# Patient Record
Sex: MALE | Race: WHITE | URBAN - METROPOLITAN AREA
[De-identification: names, ages, dates, MRNs, and addresses within clinical notes are randomized per-mention and may not be internally consistent; named-entity substitution may affect disease eponyms.]

---

## 2024-11-04 ENCOUNTER — HOSPITAL ENCOUNTER (OUTPATIENT)
Dept: GENERAL RADIOLOGY | Age: 59
Discharge: HOME | End: 2024-11-07

## 2024-11-04 DIAGNOSIS — T14.90XA INJURY: ICD-10-CM

## 2024-11-04 PROCEDURE — 73080 X-RAY EXAM OF ELBOW: CPT

## 2024-11-20 ENCOUNTER — TRANSCRIBE ORDERS (OUTPATIENT)
Dept: SCHEDULING | Age: 59
End: 2024-11-20

## 2024-11-20 DIAGNOSIS — M25.522 ELBOW PAIN, CHRONIC, LEFT: Primary | ICD-10-CM

## 2024-11-20 DIAGNOSIS — G89.29 ELBOW PAIN, CHRONIC, LEFT: Primary | ICD-10-CM

## 2025-01-08 ENCOUNTER — OFFICE VISIT (OUTPATIENT)
Age: 60
End: 2025-01-08
Payer: COMMERCIAL

## 2025-01-08 DIAGNOSIS — M54.12 CERVICAL RADICULOPATHY: ICD-10-CM

## 2025-01-08 DIAGNOSIS — G54.9 PLEXOPATHY: ICD-10-CM

## 2025-01-08 DIAGNOSIS — G56.22 CUBITAL TUNNEL SYNDROME ON LEFT: Primary | ICD-10-CM

## 2025-01-08 PROCEDURE — 99204 OFFICE O/P NEW MOD 45 MIN: CPT | Performed by: ORTHOPAEDIC SURGERY

## 2025-01-28 ENCOUNTER — OFFICE VISIT (OUTPATIENT)
Age: 60
End: 2025-01-28

## 2025-01-28 DIAGNOSIS — Z00.00 BLOOD TESTS FOR ROUTINE GENERAL PHYSICAL EXAMINATION: Primary | ICD-10-CM

## 2025-01-28 NOTE — PROGRESS NOTES
Labs drawn as ordered from right ac utilizing best practices. Procedure tolerated well . Specimens labeled and packaged as per protocol, lab  scheduled.

## 2025-01-29 LAB
ALBUMIN SERPL-MCNC: 4.6 G/DL (ref 3.8–4.9)
ALP SERPL-CCNC: 49 IU/L (ref 44–121)
ALT SERPL-CCNC: 27 IU/L (ref 0–44)
AST SERPL-CCNC: 23 IU/L (ref 0–40)
BASOPHILS # BLD AUTO: 0 X10E3/UL (ref 0–0.2)
BASOPHILS NFR BLD AUTO: 0 %
BILIRUB SERPL-MCNC: 0.4 MG/DL (ref 0–1.2)
BUN SERPL-MCNC: 9 MG/DL (ref 6–24)
BUN/CREAT SERPL: 10 (ref 9–20)
CALCIUM SERPL-MCNC: 9.8 MG/DL (ref 8.7–10.2)
CHLORIDE SERPL-SCNC: 99 MMOL/L (ref 96–106)
CO2 SERPL-SCNC: 25 MMOL/L (ref 20–29)
CREAT SERPL-MCNC: 0.86 MG/DL (ref 0.76–1.27)
EGFRCR SERPLBLD CKD-EPI 2021: 100 ML/MIN/1.73
EOSINOPHIL # BLD AUTO: 0.1 X10E3/UL (ref 0–0.4)
EOSINOPHIL NFR BLD AUTO: 1 %
ERYTHROCYTE [DISTWIDTH] IN BLOOD BY AUTOMATED COUNT: 12.3 % (ref 11.6–15.4)
GLOBULIN SER CALC-MCNC: 2.4 G/DL (ref 1.5–4.5)
GLUCOSE SERPL-MCNC: 92 MG/DL (ref 70–99)
HBA1C MFR BLD: 5.8 % (ref 4.8–5.6)
HCT VFR BLD AUTO: 53.8 % (ref 37.5–51)
HGB BLD-MCNC: 18.1 G/DL (ref 13–17.7)
IMM GRANULOCYTES # BLD AUTO: 0 X10E3/UL (ref 0–0.1)
IMM GRANULOCYTES NFR BLD AUTO: 0 %
LYMPHOCYTES # BLD AUTO: 1.9 X10E3/UL (ref 0.7–3.1)
LYMPHOCYTES NFR BLD AUTO: 38 %
MCH RBC QN AUTO: 31 PG (ref 26.6–33)
MCHC RBC AUTO-ENTMCNC: 33.6 G/DL (ref 31.5–35.7)
MCV RBC AUTO: 92 FL (ref 79–97)
MONOCYTES # BLD AUTO: 0.7 X10E3/UL (ref 0.1–0.9)
MONOCYTES NFR BLD AUTO: 13 %
NEUTROPHILS # BLD AUTO: 2.3 X10E3/UL (ref 1.4–7)
NEUTROPHILS NFR BLD AUTO: 48 %
PLATELET # BLD AUTO: 235 X10E3/UL (ref 150–450)
POTASSIUM SERPL-SCNC: 5 MMOL/L (ref 3.5–5.2)
PROT SERPL-MCNC: 7 G/DL (ref 6–8.5)
RBC # BLD AUTO: 5.84 X10E6/UL (ref 4.14–5.8)
SODIUM SERPL-SCNC: 141 MMOL/L (ref 134–144)
WBC # BLD AUTO: 4.9 X10E3/UL (ref 3.4–10.8)

## 2025-01-31 DIAGNOSIS — G56.22 CUBITAL TUNNEL SYNDROME ON LEFT: Primary | ICD-10-CM

## 2025-01-31 RX ORDER — METHYLPREDNISOLONE 4 MG/1
TABLET ORAL
Qty: 1 KIT | Refills: 0 | Status: SHIPPED | OUTPATIENT
Start: 2025-01-31

## 2025-02-05 LAB
TESTOST FREE SERPL-MCNC: 37.6 PG/ML (ref 7.2–24)
TESTOST SERPL-MCNC: 1303.1 NG/DL (ref 264–916)

## 2025-03-12 ENCOUNTER — OFFICE VISIT (OUTPATIENT)
Age: 60
End: 2025-03-12

## 2025-03-12 DIAGNOSIS — G56.22 CUBITAL TUNNEL SYNDROME ON LEFT: Primary | ICD-10-CM

## 2025-03-12 NOTE — PROGRESS NOTES
Orthopaedic Hand Clinic Note    Name: Denver Lehman Jr.  Age: 59 y.o.  YOB: 1965  Gender: male  MRN: [id number]      Follow up visit:   1. Cubital tunnel syndrome on left        HPI: Denver Lehman Jr. is a 59 y.o. male who is following up for left elbow pain, left hand numbness and paresthesias, patient reports that he continues to have numbness and paresthesias in all fingers of the left hand, this is intermittent and slightly improved from before, he also reports that the elbow pain has completely resolved.      ROS/Meds/PSH/PMH/FH/SH: I personally reviewed the patients standard intake form.  Pertinents are discussed in the HPI    Physical Examination:  General: Awake and alert.  HEENT: Normocephalic, atraumatic  CV/Pulm: Breathing even and unlabored  Skin: No obvious rashes noted.  Lymphatic: No obvious evidence of lymphedema or lymphadenopathy    Musculoskeletal Examination:  Examination on the left upper extremity demonstrates cap refill < 5 seconds in all fingers, subjectively patient reports equal sensation in both hands however, he has slightly increased to point discrimination on the small finger at 7 mm compared to 5 mm at the index finger and middle finger.  Very subtle interossei weakness but no wasting    Imaging / Electrodiagnostic Tests:     Nerve conduction study was reviewed with the patient, this demonstrates borderline elevated median sensory latency at the wrist at 3.5, normal motor with median latency at 3.9, there is evidence of decreased motor conduction velocity across the elbow segment on the ulnar nerve from 64-43 with abnormal EMG changes on the abductor digiti minimi    Assessment:   Visit Diagnoses         Codes      Cubital tunnel syndrome on left    -  Primary G56.22            Plan:   We discussed the diagnosis and different treatment options. We discussed observation, therapy, antiinflammatory medications and other pertinent treatment modalities.    After discussing

## 2025-06-04 ENCOUNTER — TELEPHONE (OUTPATIENT)
Dept: ORTHOPEDIC SURGERY | Age: 60
End: 2025-06-04

## 2025-06-04 NOTE — TELEPHONE ENCOUNTER
Received message that patient had question regarding medications/wanted new medications. Attempted to call patient with no answer.    AL Blanc

## 2025-06-04 NOTE — TELEPHONE ENCOUNTER
Patients nerve pain has returned since being seen at his last apt. He never filled the medication that was last sent in for him and would like to try it now.   The scripts can still be filled from Jan.     Called him back and LVM that the scripts from Jan can still be filled

## 2025-06-05 ENCOUNTER — TELEPHONE (OUTPATIENT)
Dept: ORTHOPEDIC SURGERY | Age: 60
End: 2025-06-05

## 2025-06-05 NOTE — TELEPHONE ENCOUNTER
Spoke to the patient and he is aware that I called the pharmacy and they will get the medication filled.

## 2025-06-05 NOTE — TELEPHONE ENCOUNTER
Patient called this morning says the Rx you sent in to [facility] in January is not there, wants to know what it was

## 2025-06-13 ENCOUNTER — OFFICE VISIT (OUTPATIENT)
Age: 60
End: 2025-06-13

## 2025-06-13 DIAGNOSIS — G56.22 CUBITAL TUNNEL SYNDROME ON LEFT: Primary | ICD-10-CM

## 2025-06-13 NOTE — PROGRESS NOTES
Orthopedic Hand Surgery Note    Denver Lehman .  1965  adult    History of Present Illness  The patient is here for a re-evaluation of left elbow pain and left hand numbness and paresthesias.    He reports that his symptoms have worsened as his activity has increased. He continues to experience pain primarily on the posterior aspect of the elbow, which radiates down the forearm, causing numbness and paresthesias in the middle, ring, and small fingers. This condition is severely impacting his everyday life, including his work duties.    Physical Exam  Positive Tinel's sign at the cubital tunnel of the elbow, discomfort with elbow flexion compression test, but it does not really aggravate his symptoms. Equivocal Tinel's sign at the carpal tunnel of the wrist, causing paresthesias in the ulnar distribution. No thenar or interossei wasting, but there is interosseous weakness compared to the contralateral side.    Imaging/NCS    Nerve conduction study was reviewed with the patient, this demonstrates borderline elevated median sensory latency at the wrist at 3.5, normal motor with median latency at 3.9, there is evidence of decreased motor conduction velocity across the elbow segment on the ulnar nerve from 64-43 with abnormal EMG changes on the abductor digiti minimi    Visit Diagnoses         Codes      Cubital tunnel syndrome on left    -  Primary G56.22          Assessment & Plan  1. Cubital tunnel syndrome.  He presents with cubital tunnel syndrome, confirmed by EMG changes. 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 Despite allowing sufficient time for potential improvement, his symptoms have not only persisted but have shown signs of worsening over the past few weeks. After a comprehensive discussion regarding the potential risks and benefits, he has decided to proceed with a cubital tunnel release

## 2025-06-19 DIAGNOSIS — G56.22 CUBITAL TUNNEL SYNDROME ON LEFT: Primary | ICD-10-CM

## 2025-06-23 DIAGNOSIS — G56.22 CUBITAL TUNNEL SYNDROME ON LEFT: Primary | ICD-10-CM

## 2025-06-23 RX ORDER — ONDANSETRON 4 MG/1
4 TABLET, FILM COATED ORAL EVERY 8 HOURS PRN
Qty: 20 TABLET | Refills: 0 | Status: SHIPPED | OUTPATIENT
Start: 2025-06-23

## 2025-06-23 RX ORDER — OXYCODONE HYDROCHLORIDE 5 MG/1
5 TABLET ORAL EVERY 6 HOURS PRN
Qty: 20 TABLET | Refills: 0 | Status: SHIPPED | OUTPATIENT
Start: 2025-06-23 | End: 2025-06-28

## 2025-06-24 ENCOUNTER — OUTSIDE SERVICES (OUTPATIENT)
Age: 60
End: 2025-06-24

## 2025-06-24 DIAGNOSIS — G56.22 CUBITAL TUNNEL SYNDROME ON LEFT: Primary | ICD-10-CM

## 2025-06-24 NOTE — OP NOTE
Hand Surgery Operative Note      Denver Lehman Jr.   60 y.o.   adult   * No surgery found *    Pre-op diagnosis: left Cubital Tunnel syndrome     Post op diagnosis: same    Procedure: left Cubital  Tunnel release, Adipofascial flap, (cpt 12621, 96487)    Surgeon: Archana Saini Jr, MD, PhD      Anesthesia: Regional block + MAC    Tourniquet time: 31 min    Procedure indications: Patient with ulnar digit numbness recalcitrant to conservative measures with positive documentation of NCV and/or physical exman findings consistent with cubital tunnel syndrome. After Thorough discussion, the patient decided to proceed with surgical management. We discussed in detail surgical risks including scar, pain, bleeding, infection, anesthetic risks, neurovascular injury, need for further surgery,  weakness, stiffness, risk of death and potential risk of other unforseen complication.  Procedure description:    Patient was placed in the supine position and after appropriate time-out and side, site and procedure confirmed, incision was made in the medial border of the elbow adjacent to the medial epicondyle under loupe magnification, blunt dissection was performed with care to preserve superficial sensory branches, cubital tunnel fascia was incised longitudinally.  Blunt retraction used to identify the ulnar nerve. Bernardo ligament and fascia were released in its entirety, the nerve was visualized at its most proximal (along the medial intermuscular septum) and distal extent of the cubital tunnel (under the two heads of the FCU). The medial intermuscular septum was inspected and released from the humeral insertion after it was deemed to cause ulnar nerve compression as well. The elbow was taken through a range of motion and the nerve was dislocating.  The decision was made to transpose the nerve. The nerve was freed proximally and distally to avoid kinking, it was transposed under an adipo-fascial sleeve at the level of the

## 2025-07-02 DIAGNOSIS — G56.22 CUBITAL TUNNEL SYNDROME ON LEFT: ICD-10-CM

## 2025-07-07 ENCOUNTER — OFFICE VISIT (OUTPATIENT)
Age: 60
End: 2025-07-07

## 2025-07-07 DIAGNOSIS — G56.22 CUBITAL TUNNEL SYNDROME ON LEFT: Primary | ICD-10-CM

## 2025-07-07 PROCEDURE — 99024 POSTOP FOLLOW-UP VISIT: CPT | Performed by: ORTHOPAEDIC SURGERY

## 2025-07-07 NOTE — PROGRESS NOTES
Orthopaedic Hand Clinic Note    Name: Denver Lehman Jr.  Age: 60 y.o.  YOB: 1965  Gender: adult  MRN: [id number]      Follow up visit:   1. Cubital tunnel syndrome on left        HPI: Denver Lehman Jr. is a 60 y.o. adult who is following up for left cubital tunnel syndrome status post cubital tunnel release and submuscular transposition 2 weeks ago, patient reports that burning electrical sensations that radiate from the medial elbow to the hand have improved significantly, the numbness is also improved but he continues to have significant paresthesias.      ROS/Meds/PSH/PMH/FH/SH: I personally reviewed the patients standard intake form.  Pertinents are discussed in the HPI    Physical Examination:  General: Awake and alert.  HEENT: Normocephalic, atraumatic  CV/Pulm: Breathing even and unlabored  Skin: No obvious rashes noted.  Lymphatic: No obvious evidence of lymphedema or lymphadenopathy    Musculoskeletal Examination:  Examination on the left upper extremity demonstrates cap refill < 5 seconds in all fingers, good elbow motion, wound appears to be healing well.    Assessment:   Visit Diagnoses         Codes      Cubital tunnel syndrome on left    -  Primary G56.22            Plan:   We discussed the diagnosis and different treatment options. We discussed observation, therapy, antiinflammatory medications and other pertinent treatment modalities.    After discussing in detail the patient elects to proceed with therapy referral for nerve gliding exercises, strengthening in 2 weeks, he may remain out of work for the next 4 weeks to concentrate on therapy and pain control.  I will reassess the patient in 4 weeks, if at that point he has recovered enough that he can resume heavier activities and we will return the patient back to work without restrictions.  MMI is expected between 6 and 12 months due to slow nerve recovery however, he will certainly be able to resume work without restrictions

## 2025-07-10 ENCOUNTER — TELEPHONE (OUTPATIENT)
Dept: ORTHOPEDIC SURGERY | Age: 60
End: 2025-07-10

## 2025-07-29 ENCOUNTER — TELEPHONE (OUTPATIENT)
Dept: ORTHOPEDIC SURGERY | Age: 60
End: 2025-07-29

## 2025-08-08 ENCOUNTER — OFFICE VISIT (OUTPATIENT)
Age: 60
End: 2025-08-08

## 2025-08-08 DIAGNOSIS — G56.22 CUBITAL TUNNEL SYNDROME ON LEFT: Primary | ICD-10-CM

## 2025-08-08 PROCEDURE — 99024 POSTOP FOLLOW-UP VISIT: CPT | Performed by: ORTHOPAEDIC SURGERY
